# Patient Record
Sex: FEMALE | Race: AMERICAN INDIAN OR ALASKA NATIVE | NOT HISPANIC OR LATINO | Employment: OTHER | URBAN - METROPOLITAN AREA
[De-identification: names, ages, dates, MRNs, and addresses within clinical notes are randomized per-mention and may not be internally consistent; named-entity substitution may affect disease eponyms.]

---

## 2019-08-13 PROBLEM — G43.009 MIGRAINE WITHOUT AURA AND WITHOUT STATUS MIGRAINOSUS, NOT INTRACTABLE: Status: ACTIVE | Noted: 2019-08-13

## 2019-08-13 PROBLEM — E03.9 ACQUIRED HYPOTHYROIDISM: Status: ACTIVE | Noted: 2019-08-13

## 2019-08-13 PROBLEM — K21.9 GASTROESOPHAGEAL REFLUX DISEASE WITHOUT ESOPHAGITIS: Status: ACTIVE | Noted: 2019-08-13

## 2021-05-04 ENCOUNTER — PATIENT MESSAGE (OUTPATIENT)
Dept: RESEARCH | Facility: HOSPITAL | Age: 51
End: 2021-05-04

## 2022-04-14 DIAGNOSIS — Z12.31 OTHER SCREENING MAMMOGRAM: ICD-10-CM

## 2022-07-11 ENCOUNTER — PATIENT MESSAGE (OUTPATIENT)
Dept: ADMINISTRATIVE | Facility: HOSPITAL | Age: 52
End: 2022-07-11

## 2022-07-19 ENCOUNTER — PATIENT OUTREACH (OUTPATIENT)
Dept: ADMINISTRATIVE | Facility: HOSPITAL | Age: 52
End: 2022-07-19

## 2022-09-01 ENCOUNTER — PATIENT OUTREACH (OUTPATIENT)
Dept: ADMINISTRATIVE | Facility: HOSPITAL | Age: 52
End: 2022-09-01

## 2022-09-01 NOTE — PROGRESS NOTES
Attempted to contact pt to remind to returned fit kit. Unable to contact. No answer left message.

## 2022-10-06 ENCOUNTER — PATIENT OUTREACH (OUTPATIENT)
Dept: ADMINISTRATIVE | Facility: HOSPITAL | Age: 52
End: 2022-10-06

## 2022-10-06 NOTE — PROGRESS NOTES
Attempted to contact pt to remind to return fit kit. Unable to contact. No answer message left

## 2022-11-07 ENCOUNTER — LAB VISIT (OUTPATIENT)
Dept: LAB | Facility: HOSPITAL | Age: 52
End: 2022-11-07

## 2022-11-07 DIAGNOSIS — Z12.11 SCREEN FOR COLON CANCER: ICD-10-CM

## 2022-11-07 PROCEDURE — 82274 ASSAY TEST FOR BLOOD FECAL: CPT | Performed by: NURSE PRACTITIONER

## 2022-11-08 ENCOUNTER — PATIENT OUTREACH (OUTPATIENT)
Dept: ADMINISTRATIVE | Facility: HOSPITAL | Age: 52
End: 2022-11-08

## 2022-11-10 LAB — HEMOCCULT STL QL IA: NEGATIVE

## 2022-12-02 ENCOUNTER — PATIENT MESSAGE (OUTPATIENT)
Dept: RESEARCH | Facility: HOSPITAL | Age: 52
End: 2022-12-02

## 2023-06-07 DIAGNOSIS — Z12.31 OTHER SCREENING MAMMOGRAM: ICD-10-CM

## 2023-07-10 ENCOUNTER — PATIENT MESSAGE (OUTPATIENT)
Dept: ADMINISTRATIVE | Facility: HOSPITAL | Age: 53
End: 2023-07-10

## 2023-12-23 ENCOUNTER — NURSE TRIAGE (OUTPATIENT)
Dept: ADMINISTRATIVE | Facility: CLINIC | Age: 53
End: 2023-12-23
Payer: MEDICAID

## 2023-12-23 ENCOUNTER — HOSPITAL ENCOUNTER (EMERGENCY)
Facility: HOSPITAL | Age: 53
Discharge: HOME | End: 2023-12-23
Attending: EMERGENCY MEDICINE
Payer: MEDICAID

## 2023-12-23 VITALS
OXYGEN SATURATION: 100 % | HEART RATE: 68 BPM | BODY MASS INDEX: 33.31 KG/M2 | RESPIRATION RATE: 18 BRPM | DIASTOLIC BLOOD PRESSURE: 58 MMHG | HEIGHT: 65 IN | TEMPERATURE: 98 F | SYSTOLIC BLOOD PRESSURE: 102 MMHG | WEIGHT: 199.94 LBS

## 2023-12-23 DIAGNOSIS — K55.069 OCCLUSION OF SUPERIOR MESENTERIC ARTERY: ICD-10-CM

## 2023-12-23 DIAGNOSIS — R10.9 ABDOMINAL PAIN, UNSPECIFIED ABDOMINAL LOCATION: Primary | ICD-10-CM

## 2023-12-23 PROCEDURE — 63600175 PHARM REV CODE 636 W HCPCS

## 2023-12-23 PROCEDURE — 96365 THER/PROPH/DIAG IV INF INIT: CPT

## 2023-12-23 PROCEDURE — 25000003 PHARM REV CODE 250: Performed by: STUDENT IN AN ORGANIZED HEALTH CARE EDUCATION/TRAINING PROGRAM

## 2023-12-23 PROCEDURE — 99284 EMERGENCY DEPT VISIT MOD MDM: CPT | Mod: 25

## 2023-12-23 RX ORDER — HEPARIN SODIUM,PORCINE/D5W 25000/250
0-40 INTRAVENOUS SOLUTION INTRAVENOUS CONTINUOUS
Status: DISCONTINUED | OUTPATIENT
Start: 2023-12-23 | End: 2023-12-23

## 2023-12-23 RX ORDER — POLYETHYLENE GLYCOL 3350 17 G/17G
17 POWDER, FOR SOLUTION ORAL DAILY
Qty: 7 EACH | Refills: 0 | Status: SHIPPED | OUTPATIENT
Start: 2023-12-23 | End: 2023-12-30

## 2023-12-23 RX ORDER — SYRING-NEEDL,DISP,INSUL,0.3 ML 29 G X1/2"
296 SYRINGE, EMPTY DISPOSABLE MISCELLANEOUS ONCE
Qty: 296 ML | Refills: 0 | Status: SHIPPED | OUTPATIENT
Start: 2023-12-23 | End: 2023-12-23

## 2023-12-23 RX ORDER — DOCUSATE SODIUM 100 MG/1
100 CAPSULE, LIQUID FILLED ORAL 2 TIMES DAILY
Qty: 60 CAPSULE | Refills: 0 | Status: SHIPPED | OUTPATIENT
Start: 2023-12-23 | End: 2024-01-22

## 2023-12-23 RX ORDER — HYDROMORPHONE HYDROCHLORIDE 1 MG/ML
1 INJECTION, SOLUTION INTRAMUSCULAR; INTRAVENOUS; SUBCUTANEOUS
Status: DISCONTINUED | OUTPATIENT
Start: 2023-12-23 | End: 2023-12-23 | Stop reason: HOSPADM

## 2023-12-23 RX ADMIN — APIXABAN 10 MG: 5 TABLET, FILM COATED ORAL at 08:12

## 2023-12-23 RX ADMIN — HEPARIN SODIUM AND DEXTROSE 18 UNITS/KG/HR: 10000; 5 INJECTION INTRAVENOUS at 05:12

## 2023-12-23 NOTE — CONSULTS
[facility] - Emergency Dept  Vascular Surgery  Consult Note    Consults: Inpatient Vascular Surgery  Subjective:     Chief Complaint/Reason for Admission: Superior Mesenteric Artery thrombus    History of Present Illness: 53-year-old female with past medical history of migraines now [place] [facility] [place] after SMA thrombus noted.  Patient presented to outside emergency department today with acute abdominal pain and was noted to have an SMA thrombus and was started on heparin drip.  Initial lactate 0.6.  Upon arrival to emergency department patient is comfortable with well-controlled pain and on heparin drip.  Patient denies any palpitations, abdominal pain, difficulty in eating, no abdominal pain after eating, eats 3 meals daily without any difficulty. Denies family history of hypercoagulability.     (Not in a hospital admission)      Review of patient's allergies indicates:  No Known Allergies    Past Medical History:   Diagnosis Date    Migraine headache      Past Surgical History:   Procedure Laterality Date    ESOPHAGOGASTRODUODENOSCOPY N/A 3/23/2023    Procedure: EGD (ESOPHAGOGASTRODUODENOSCOPY);  Surgeon: James Kincaid MD;  Location: [facility];  Service: Endoscopy;  Laterality: N/A;    HYSTERECTOMY       Family History       Problem Relation (Age of Onset)    Breast cancer Paternal Aunt    Diabetes Mother    Hepatitis Mother    No Known Problems Father          Tobacco Use    Smoking status: Never    Smokeless tobacco: Never   Substance and Sexual Activity    Alcohol use: Not Currently     Comment: occasionally    Drug use: No    Sexual activity: Not on file     Review of Systems   Constitutional: Negative.    HENT: Negative.     Eyes: Negative.    Respiratory:  Negative for apnea, cough, shortness of breath and wheezing.    Cardiovascular:  Negative for chest pain and palpitations.   Gastrointestinal:  Positive for constipation. Negative for abdominal distention, abdominal pain,  diarrhea, nausea and vomiting.   Genitourinary: Negative.    Musculoskeletal: Negative.    Skin: Negative.    Allergic/Immunologic: Negative.    Neurological: Negative.    Hematological: Negative.    Psychiatric/Behavioral: Negative.     All other systems reviewed and are negative.    Objective:     Vital Signs (Most Recent):  Temp: 97.9 °F (36.6 °C) (12/23/23 0710)  Pulse: 68 (12/23/23 0832)  Resp: 18 (12/23/23 0417)  BP: (!) 102/58 (12/23/23 0832)  SpO2: 100 % (12/23/23 0832) Vital Signs (24h Range):  Temp:  [97.8 °F (36.6 °C)-98 °F (36.7 °C)] 97.9 °F (36.6 °C)  Pulse:  [68-93] 68  Resp:  [18-23] 18  SpO2:  [99 %-100 %] 100 %  BP: (102-140)/(56-76) 102/58     Weight: 90.7 kg (199 lb 15.3 oz)  Body mass index is 33.27 kg/m².      Physical Exam  Vitals reviewed.   Constitutional:       General: She is not in acute distress.     Appearance: Normal appearance.   HENT:      Head: Normocephalic and atraumatic.      Nose: Nose normal.      Mouth/Throat:      Mouth: Mucous membranes are moist.      Pharynx: Oropharynx is clear.   Eyes:      Extraocular Movements: Extraocular movements intact.      Pupils: Pupils are equal, round, and reactive to light.   Cardiovascular:      Rate and Rhythm: Normal rate and regular rhythm.      Pulses: Normal pulses.      Heart sounds: Normal heart sounds.   Pulmonary:      Effort: Pulmonary effort is normal.      Breath sounds: Normal breath sounds.   Abdominal:      General: There is no distension.      Palpations: There is no mass.      Tenderness: There is abdominal tenderness. There is no guarding or rebound.      Hernia: No hernia is present.   Musculoskeletal:         General: Normal range of motion.      Cervical back: Normal range of motion.   Skin:     General: Skin is warm.      Capillary Refill: Capillary refill takes less than 2 seconds.   Neurological:      General: No focal deficit present.      Mental Status: She is alert and oriented to person, place, and time.    Psychiatric:         Mood and Affect: Mood normal.          Significant Labs:  All pertinent labs from the last 24 hours have been reviewed.    Significant Diagnostics:  I have reviewed all pertinent imaging results/findings within the past 24 hours.  Assessment/Plan:     Superior mesenteric artery thrombosis  Reviewed imaging : CT Scan abdomen 12/23/23 shows Superior Mesenteric Artery thrombosis  Start Eliquis 10 mg BID for 7 days followed by 5 mg BID  Discharge disposition as per Emergency Department Team        Thank you for your consult. I will sign off. Please contact us if you have any additional questions.    Jose Angel Oconnell MD  Vascular Surgery  [facility] - Emergency Dept

## 2023-12-23 NOTE — ED TRIAGE NOTES
Nupur West, a 53 y.o. female presents to the ED w/ complaint of transfer from [facility] for Vasc surg consult. Pt sent here for non-occlusive SMA thrombus with pain and stranding mesentery. Pt denies pain, N/V at this time.    Triage note:  Chief Complaint   Patient presents with    [facility] Transfer     Transfer for Vasc Surg consult for non-occlusive SMA thrombus. Arrives on Heparin drip @18 units/kg.     Review of patient's allergies indicates:  No Known Allergies  Past Medical History:   Diagnosis Date    Migraine headache

## 2023-12-23 NOTE — PROVIDER PROGRESS NOTES - EMERGENCY DEPT.
ED Resident HAND-OFF NOTE:  7:02 AM 12/23/2023  Nupur West is a 53 y.o. female who presented to the ED on 12/23/2023 and has been managed by Dr. Jean-Baptiste and Dr. Chiang, who reports patient C/O non-occlusive SMA thrombus . I assumed care of patient from off-going ED physician team at 7:02 AM pending vascular surgery evaluation and ultimate disposition.    On my evaluation, Nupur West appears well, hemodynamically stable and in NAD. Thus far, Nupur West has received:  Medications   HYDROmorphone injection 1 mg (has no administration in time range)   apixaban tablet 10 mg (has no administration in time range)     Followed by   apixaban tablet 5 mg (has no administration in time range)     - vascular surgery evaluated the patient and looked at the scan.  They recommend starting the patient on Eliquis.  Will place an outpatient referral to vascular surgery.  Bowel regimen ordered due to constipation.  Return precautions provided.  All questions answered.  Will discharge the patient.  ______________________  Veronica Chung MD  Emergency Medicine Resident  7:02 AM 12/23/2023

## 2023-12-23 NOTE — SUBJECTIVE & OBJECTIVE
(Not in a hospital admission)      Review of patient's allergies indicates:  No Known Allergies    Past Medical History:   Diagnosis Date    Migraine headache      Past Surgical History:   Procedure Laterality Date    ESOPHAGOGASTRODUODENOSCOPY N/A 3/23/2023    Procedure: EGD (ESOPHAGOGASTRODUODENOSCOPY);  Surgeon: James Kincaid MD;  Location: [facility];  Service: Endoscopy;  Laterality: N/A;    HYSTERECTOMY       Family History       Problem Relation (Age of Onset)    Breast cancer Paternal Aunt    Diabetes Mother    Hepatitis Mother    No Known Problems Father          Tobacco Use    Smoking status: Never    Smokeless tobacco: Never   Substance and Sexual Activity    Alcohol use: Not Currently     Comment: occasionally    Drug use: No    Sexual activity: Not on file     Review of Systems   Constitutional: Negative.    HENT: Negative.     Eyes: Negative.    Respiratory:  Negative for apnea, cough, shortness of breath and wheezing.    Cardiovascular:  Negative for chest pain and palpitations.   Gastrointestinal:  Positive for constipation. Negative for abdominal distention, abdominal pain, diarrhea, nausea and vomiting.   Genitourinary: Negative.    Musculoskeletal: Negative.    Skin: Negative.    Allergic/Immunologic: Negative.    Neurological: Negative.    Hematological: Negative.    Psychiatric/Behavioral: Negative.     All other systems reviewed and are negative.    Objective:     Vital Signs (Most Recent):  Temp: 97.9 °F (36.6 °C) (12/23/23 0710)  Pulse: 68 (12/23/23 0832)  Resp: 18 (12/23/23 0417)  BP: (!) 102/58 (12/23/23 0832)  SpO2: 100 % (12/23/23 0832) Vital Signs (24h Range):  Temp:  [97.8 °F (36.6 °C)-98 °F (36.7 °C)] 97.9 °F (36.6 °C)  Pulse:  [68-93] 68  Resp:  [18-23] 18  SpO2:  [99 %-100 %] 100 %  BP: (102-140)/(56-76) 102/58     Weight: 90.7 kg (199 lb 15.3 oz)  Body mass index is 33.27 kg/m².      Physical Exam  Vitals reviewed.   Constitutional:       General: She is not in acute distress.      Appearance: Normal appearance.   HENT:      Head: Normocephalic and atraumatic.      Nose: Nose normal.      Mouth/Throat:      Mouth: Mucous membranes are moist.      Pharynx: Oropharynx is clear.   Eyes:      Extraocular Movements: Extraocular movements intact.      Pupils: Pupils are equal, round, and reactive to light.   Cardiovascular:      Rate and Rhythm: Normal rate and regular rhythm.      Pulses: Normal pulses.      Heart sounds: Normal heart sounds.   Pulmonary:      Effort: Pulmonary effort is normal.      Breath sounds: Normal breath sounds.   Abdominal:      General: There is no distension.      Palpations: There is no mass.      Tenderness: There is abdominal tenderness. There is no guarding or rebound.      Hernia: No hernia is present.   Musculoskeletal:         General: Normal range of motion.      Cervical back: Normal range of motion.   Skin:     General: Skin is warm.      Capillary Refill: Capillary refill takes less than 2 seconds.   Neurological:      General: No focal deficit present.      Mental Status: She is alert and oriented to person, place, and time.   Psychiatric:         Mood and Affect: Mood normal.          Significant Labs:  All pertinent labs from the last 24 hours have been reviewed.    Significant Diagnostics:  I have reviewed all pertinent imaging results/findings within the past 24 hours.

## 2023-12-23 NOTE — ASSESSMENT & PLAN NOTE
Reviewed imaging : CT Scan abdomen 12/23/23 shows Superior Mesenteric Artery thrombosis  Start Eliquis 10 mg BID for 7 days followed by 5 mg BID  Discharge disposition as per Emergency Department Team

## 2023-12-23 NOTE — DISCHARGE INSTRUCTIONS
Please return to the emergency department if you develop any new or worsening symptoms.    Your symptoms today were likely due to constipation.  Incidental finding of the SMA thrombus was evaluated by vascular surgery here.  We are starting you on a blood thinner called Eliquis.  He will take 10 mg for the 1st week and then continue with 5 mg.    We ordered a bowel regimen for you to take to your pharmacy: [facility].

## 2023-12-23 NOTE — ED NOTES
Assumed care of this pt at this time   Patient identifiers verified and correct for Nupur West   LOC: The patient is awake, alert and aware of environment with an appropriate affect, the patient is oriented x 3 and speaking appropriately.   APPEARANCE: Patient appears comfortable and in no acute distress, patient is clean and well groomed.  SKIN: The skin is warm and dry, color consistent with ethnicity, patient has normal skin turgor and moist mucus membranes, skin intact  MUSCULOSKELETAL: Patient moving all extremities spontaneously, no swelling noted.  RESPIRATORY: Airway is open and patent, respirations are spontaneous, patient has a normal effort and rate, no accessory muscle use noted, pt placed on continuous pulse ox with O2 sats noted at 99% on room air.  CARDIAC: Patient has a normal rate, no edema noted, capillary refill < 3 seconds.   GASTRO: Soft and non tender to palpation, no distention noted.   : Pt denies any pain or frequency with urination.  NEURO: Pt opens eyes spontaneously, behavior appropriate to situation, follows commands, facial expression symmetrical, bilateral hand grasp equal and even, purposeful motor response noted, normal sensation in all extremities when touched with a finger.

## 2023-12-23 NOTE — ED PROVIDER NOTES
Encounter Date: 12/23/2023       History     Chief Complaint   Patient presents with    [facility] Transfer     Transfer for Vas Surg consult for non-occlusive SMA thrombus. Arrives on Heparin drip @18 units/kg.     53-year-old female with past medical history of migraines now transferred to [facility] [place] vascular surgery evaluation after SMA thrombus noted.  Patient presented to outside emergency department today with acute abdominal pain and was noted to have an SMA thrombus and was started on heparin drip.  Initial lactate 0.6.  Upon arrival to emergency department patient is comfortable with well-controlled pain and on heparin drip.  Patient denies any palpitations or family history of hypercoagulability.    The history is provided by the patient and medical records.     Review of patient's allergies indicates:  No Known Allergies  Past Medical History:   Diagnosis Date    Migraine headache      Past Surgical History:   Procedure Laterality Date    ESOPHAGOGASTRODUODENOSCOPY N/A 3/23/2023    Procedure: EGD (ESOPHAGOGASTRODUODENOSCOPY);  Surgeon: James Kincaid MD;  Location: [facility];  Service: Endoscopy;  Laterality: N/A;    HYSTERECTOMY       Family History   Problem Relation Age of Onset    Diabetes Mother     Hepatitis Mother     No Known Problems Father     Breast cancer Paternal Aunt      Social History     Tobacco Use    Smoking status: Never    Smokeless tobacco: Never   Substance Use Topics    Alcohol use: Not Currently     Comment: occasionally    Drug use: No     Review of Systems  See HPI    Physical Exam     Initial Vitals [12/23/23 0417]   BP Pulse Resp Temp SpO2   114/60 90 18 98 °F (36.7 °C) 99 %      MAP       --         Physical Exam    Nursing note and vitals reviewed.      Gen: AxOx3, well nourished, appears stated age, no pallor, no jaundice, appears well hydrated  Eye: EOMI, no scleral icterus, no periorbital edema or ecchymosis  Head: Normocephalic, atraumatic, no lesions, scalp appears  normal  ENT: Neck supple, no stridor, no masses, no drooling or voice changes  CVS: All distal pulses intact with normal rate and rhythm, no JVD, normal S1/S2, no murmur  Pulm: Normal breath sounds, no wheezes, rales or rhonchi, no increased work of breathing  Abd:  Nondistended, soft, nontender, no organomegaly, no CVAT  Ext: No edema, no lesions, rashes, or deformity  Neuro: GCS15, moving all extremities, gait intact, face grossly symmetric  Psych: normal affect, cooperative, well groomed, makes good eye contact    ED Course   Procedures  Labs Reviewed - No data to display         Imaging Results    None          Medications   HYDROmorphone injection 1 mg (has no administration in time range)     Medical Decision Making  Initial assessment  53-year-old female transferred to [facility] for vascular surgery evaluation after SMA thrombus noted. Patient is able to vocalise, breathing spontaneously, hemodynamically stable, oriented, moving all 4 limbs spontaneously.  Patient examines well and pain is well-controlled.    ED management  Patient was continued on heparin drip and vascular surgery was consulted.  Vascular surgery reviewed imaging and was not concerned about total occlusion and recommended discontinuing heparin drip.  Patient was noted to have constipation.  Patient was consulted on findings and bowel regimen was prescribed.  Patient was signed out to oncoming team pending evaluation by vascular surgery and likely discharge with plan to follow up with primary care doctor and bowel regimen as prescribed.      Amount and/or Complexity of Data Reviewed  Labs: ordered.    Risk  OTC drugs.  Prescription drug management.               ED Course as of 12/23/23 0803   Sat Dec 23, 2023   0612 Discussed with vascular surgery on-call.  Presentation is less concerning for SMA occlusion.  Significant stool burden noted without distal impaction.  Plan for discharge with bowel regimen. [DS]      ED Course User Index  [DS]  Tim Jean-Baptiste MD                           Clinical Impression:  Final diagnoses:  [R10.9] Abdominal pain, unspecified abdominal location (Primary)  [K55.069] Occlusion of superior mesenteric artery                 Tomas Chiang MD  Resident  12/23/23 0842

## 2023-12-23 NOTE — HPI
53-year-old female with past medical history of migraines now transferred to [facility] for vascular surgery evaluation after SMA thrombus noted.  Patient presented to outside emergency department today with acute abdominal pain and was noted to have an SMA thrombus and was started on heparin drip.  Initial lactate 0.6.  Upon arrival to emergency department patient is comfortable with well-controlled pain and on heparin drip.  Patient denies any palpitations, abdominal pain, difficulty in eating, no abdominal pain after eating, eats 3 meals daily without any difficulty. Denies family history of hypercoagulability.

## 2023-12-24 NOTE — TELEPHONE ENCOUNTER
Pt calling regarding a recent prescription for Eliquis. Reports this medication was given in the ED but is too expensive. Would like an alternative medication that may be more affordable. I have called the on call provider, per protocol, in this regard who advises I speak with the prescriber of the medication. I have asked if the patient should be re evaluated in the ED for another prescription but she was unable to answer this. I have sent the prescriber a secure chat and will update patient as I receive further information. Patient updated and given options of virtual visits and being evaluated again. Verbalizes understanding but would like follow up in this regard.     Reason for Disposition   [1] Caller has URGENT medicine question about med that PCP or specialist prescribed AND [2] triager unable to answer question    Protocols used: Medication Question Call-A-

## 2024-01-10 ENCOUNTER — TELEPHONE (OUTPATIENT)
Dept: VASCULAR SURGERY | Facility: CLINIC | Age: 54
End: 2024-01-10

## 2024-01-10 DIAGNOSIS — K55.069 SUPERIOR MESENTERIC ARTERY THROMBOSIS: Primary | ICD-10-CM

## 2024-01-10 NOTE — TELEPHONE ENCOUNTER
Attempted to contact pt to schedule appt. Voice message left for pt with appt details, instructions to fast 8 hours prior to ultrasound, and requesting return call for questions or concerns.----- Message from LIN Byrd III, MD sent at 1/10/2024  9:45 AM CST -----  Mesenteric duplex please  ----- Message -----  From: Avis Jarrell RN  Sent: 1/10/2024   8:59 AM CST  To: LIN Byrd III, MD    This patient is scheduled to see you in clinic next Tuesday for an SMA thrombus. She was seen in the ED by the cardiology intern who just left us - Dr. Oconnell - and was started on Eliquis. Do you need to see her in clinic, or should she follow up with vascular medicine?  Avis

## 2024-01-11 ENCOUNTER — TELEPHONE (OUTPATIENT)
Dept: VASCULAR SURGERY | Facility: CLINIC | Age: 54
End: 2024-01-11
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
## 2024-01-29 ENCOUNTER — TELEPHONE (OUTPATIENT)
Dept: VASCULAR SURGERY | Facility: CLINIC | Age: 54
End: 2024-01-29

## 2024-01-29 NOTE — TELEPHONE ENCOUNTER
Contacted pt in reference to appt with Dr. Byrd for SMA thrombosis and insurance coverage. Pt confirms she does not have insurance coverage. Nurse notified pt that she cannot be seen for app[place] [facility] without [place] coverage and offered to have referral sent to [facility] vascular surgery clinic. Pt states she understands appt must be cancelled and agreed to have referral sent to [facility]. Referral sent as requested.

## 2024-02-02 ENCOUNTER — PATIENT OUTREACH (OUTPATIENT)
Dept: ADMINISTRATIVE | Facility: HOSPITAL | Age: 54
End: 2024-02-02

## 2024-02-14 ENCOUNTER — TELEPHONE (OUTPATIENT)
Dept: VASCULAR SURGERY | Facility: CLINIC | Age: 54
End: 2024-02-14

## 2024-02-14 NOTE — TELEPHONE ENCOUNTER
"Contacted pt in response to message. Notified pt that referral was sent to [facility] on 01/29/24. Pt states she contacted [facility] vascular surgery and was told that referral was received but "do not schedule" was listed on pt's referral. Nurse reviewed referral and notified pt that this is not listed anywhere on referral sent to [facility] by nurse. Notified pt that nurse will send referral again. Pt verbalized understanding. ----- Message from Domenica Mahajan MA sent at 2/12/2024  8:33 AM CST -----  Regarding: FW: Advise  Contact: [phone]  I didn't see any referral for this pt . Please assist , thank you !  ----- Message -----  From: Fiordaliza Dove  Sent: 2/12/2024   8:15 AM CST  To: Rochelle MAHONEY III Staff  Subject: Advise                                           Nupur West calling regarding Patient Advice (message) for # pt would like to speak to Avis in the office regarding sending a referral sent to [facility] if possible      "

## 2024-02-27 ENCOUNTER — PATIENT OUTREACH (OUTPATIENT)
Dept: ADMINISTRATIVE | Facility: HOSPITAL | Age: 54
End: 2024-02-27

## 2024-02-27 DIAGNOSIS — Z12.12 SCREENING FOR COLORECTAL CANCER: Primary | ICD-10-CM

## 2024-02-27 DIAGNOSIS — Z12.11 SCREENING FOR COLORECTAL CANCER: Primary | ICD-10-CM

## 2024-03-05 ENCOUNTER — LAB VISIT (OUTPATIENT)
Dept: LAB | Facility: HOSPITAL | Age: 54
End: 2024-03-05
Payer: MEDICAID

## 2024-03-05 DIAGNOSIS — Z12.11 SCREENING FOR COLORECTAL CANCER: ICD-10-CM

## 2024-03-05 DIAGNOSIS — Z12.12 SCREENING FOR COLORECTAL CANCER: ICD-10-CM

## 2024-03-05 LAB — HEMOCCULT STL QL IA: NEGATIVE

## 2024-03-05 PROCEDURE — 82274 ASSAY TEST FOR BLOOD FECAL: CPT | Performed by: NURSE PRACTITIONER

## 2024-03-22 LAB — BCS RECOMMENDATION EXT: NORMAL

## 2024-08-13 ENCOUNTER — PATIENT OUTREACH (OUTPATIENT)
Dept: ADMINISTRATIVE | Facility: HOSPITAL | Age: 54
End: 2024-08-13
Payer: MEDICAID

## 2024-08-15 ENCOUNTER — PATIENT OUTREACH (OUTPATIENT)
Dept: ADMINISTRATIVE | Facility: HOSPITAL | Age: 54
End: 2024-08-15
Payer: MEDICAID

## 2024-10-17 ENCOUNTER — PATIENT MESSAGE (OUTPATIENT)
Dept: RESEARCH | Facility: HOSPITAL | Age: 54
End: 2024-10-17
Payer: MEDICAID

## 2025-06-20 ENCOUNTER — LAB VISIT (OUTPATIENT)
Dept: LAB | Facility: HOSPITAL | Age: 55
End: 2025-06-20
Payer: MEDICAID

## 2025-06-20 DIAGNOSIS — Z12.11 SCREEN FOR COLON CANCER: ICD-10-CM

## 2025-06-20 PROCEDURE — 82274 ASSAY TEST FOR BLOOD FECAL: CPT

## 2025-06-27 LAB — OB PNL STL IA: NEGATIVE
